# Patient Record
Sex: MALE | Race: WHITE | NOT HISPANIC OR LATINO | Employment: PART TIME | URBAN - NONMETROPOLITAN AREA
[De-identification: names, ages, dates, MRNs, and addresses within clinical notes are randomized per-mention and may not be internally consistent; named-entity substitution may affect disease eponyms.]

---

## 2017-01-30 RX ORDER — FLUOXETINE HYDROCHLORIDE 40 MG/1
CAPSULE ORAL
Qty: 90 CAPSULE | Refills: 1 | Status: SHIPPED | OUTPATIENT
Start: 2017-01-30 | End: 2017-02-14 | Stop reason: SDUPTHER

## 2017-02-09 PROBLEM — E66.9 OBESITY: Status: ACTIVE | Noted: 2017-02-09

## 2017-02-09 PROBLEM — F06.30 MOOD DISORDER DUE TO A GENERAL MEDICAL CONDITION: Status: ACTIVE | Noted: 2017-02-09

## 2017-02-09 PROBLEM — F32.A DEPRESSION: Status: ACTIVE | Noted: 2017-02-09

## 2017-02-09 PROBLEM — T78.40XA ALLERGY: Status: ACTIVE | Noted: 2017-02-09

## 2017-02-09 PROBLEM — I10 HYPERTENSION: Status: ACTIVE | Noted: 2017-02-09

## 2017-02-14 ENCOUNTER — OFFICE VISIT (OUTPATIENT)
Dept: INTERNAL MEDICINE | Facility: CLINIC | Age: 27
End: 2017-02-14

## 2017-02-14 VITALS
WEIGHT: 315 LBS | HEART RATE: 88 BPM | HEIGHT: 76 IN | BODY MASS INDEX: 38.36 KG/M2 | OXYGEN SATURATION: 97 % | DIASTOLIC BLOOD PRESSURE: 86 MMHG | TEMPERATURE: 98.3 F | SYSTOLIC BLOOD PRESSURE: 130 MMHG | RESPIRATION RATE: 14 BRPM

## 2017-02-14 DIAGNOSIS — I10 ESSENTIAL HYPERTENSION: Primary | ICD-10-CM

## 2017-02-14 DIAGNOSIS — T78.40XD ALLERGY, SUBSEQUENT ENCOUNTER: ICD-10-CM

## 2017-02-14 DIAGNOSIS — F06.30 MOOD DISORDER DUE TO A GENERAL MEDICAL CONDITION: ICD-10-CM

## 2017-02-14 DIAGNOSIS — F32.A DEPRESSION, UNSPECIFIED DEPRESSION TYPE: ICD-10-CM

## 2017-02-14 DIAGNOSIS — E66.9 OBESITY, UNSPECIFIED OBESITY SEVERITY, UNSPECIFIED OBESITY TYPE: ICD-10-CM

## 2017-02-14 PROCEDURE — 99213 OFFICE O/P EST LOW 20 MIN: CPT | Performed by: INTERNAL MEDICINE

## 2017-02-14 RX ORDER — FLUOXETINE HYDROCHLORIDE 40 MG/1
40 CAPSULE ORAL DAILY
Qty: 90 CAPSULE | Refills: 3 | Status: SHIPPED | OUTPATIENT
Start: 2017-02-14 | End: 2018-03-21 | Stop reason: SDUPTHER

## 2017-02-14 RX ORDER — LISINOPRIL 10 MG/1
10 TABLET ORAL DAILY
Qty: 90 TABLET | Refills: 3 | Status: SHIPPED | OUTPATIENT
Start: 2017-02-14 | End: 2018-02-10 | Stop reason: SDUPTHER

## 2017-02-14 NOTE — PROGRESS NOTES
Subjective   Galileo Patton is a 26 y.o. male.     Chief Complaint   Patient presents with   • [facility]     has seen Lillie        History of Present Illness   Patient here for follow-up.  Hypertension stable medication.  Depression stable medication.  Allergy stable medication.  Weight is still elevated BMI very high 41.9.  Denies any joint pain.    Current Outpatient Prescriptions:   •  FLUoxetine (PROzac) 40 MG capsule, Take 1 capsule by mouth Daily., Disp: 90 capsule, Rfl: 3  •  lisinopril (PRINIVIL,ZESTRIL) 10 MG tablet, Take 1 tablet by mouth Daily., Disp: 90 tablet, Rfl: 3    The following portions of the patient's history were reviewed and updated as appropriate: allergies, current medications, past family history, past medical history, past social history, past surgical history and problem list.    Review of Systems   Constitutional: Negative.    Respiratory: Negative.    Cardiovascular: Negative.    Gastrointestinal: Negative.    Skin: Negative.    Psychiatric/Behavioral: Negative.        Objective   Physical Exam   Constitutional: He is oriented to person, place, and time. He appears well-developed and well-nourished.   Neck: Neck supple.   Cardiovascular: Normal rate, regular rhythm and normal heart sounds.    Pulmonary/Chest: Effort normal and breath sounds normal.   Abdominal: Soft. Bowel sounds are normal.   Neurological: He is alert and oriented to person, place, and time.   Psychiatric: He has a normal mood and affect. His behavior is normal.       All tests have been reviewed.    Assessment/Plan   There are no diagnoses linked to this encounter.          HTN: Continue with meds. refill     Depression: Continue meds. refill     Allergies: Continue meds    Vaccine today flushot and tdap    Obesity diet and counseling     RTC in 1 PE after labs

## 2018-02-12 RX ORDER — LISINOPRIL 10 MG/1
TABLET ORAL
Qty: 90 TABLET | Refills: 3 | Status: SHIPPED | OUTPATIENT
Start: 2018-02-12

## 2018-03-21 RX ORDER — FLUOXETINE HYDROCHLORIDE 40 MG/1
CAPSULE ORAL
Qty: 90 CAPSULE | Refills: 3 | Status: SHIPPED | OUTPATIENT
Start: 2018-03-21